# Patient Record
Sex: MALE | Race: WHITE | NOT HISPANIC OR LATINO | URBAN - METROPOLITAN AREA
[De-identification: names, ages, dates, MRNs, and addresses within clinical notes are randomized per-mention and may not be internally consistent; named-entity substitution may affect disease eponyms.]

---

## 2017-02-14 NOTE — PATIENT DISCUSSION
Each treatment protocol needs to be individualized to include treatment of aqueous deficiency, evaporative deficiency, blepharitis, lid malformations, lid hygiene, life style changes. Explanation patient education and extensive treatment protocols must be established.

## 2017-02-14 NOTE — PATIENT DISCUSSION
Considerable counseling and coordination of care is needed with each patient in order to establish an accurate treatment plan.

## 2017-02-14 NOTE — PATIENT DISCUSSION
While this is an established [place] patient, he/she is a new patient to the dry eye service. Dry eye disease is a complex, multifactorial disease and often times the symptoms and findings can be confusing. In order to accurately diagnose and treat dry eye disease, testing and extensive one on one patient time is needed. A complete review of all prescribed and OTC medications need to be reviewed. Also lifestyle choices such as contact lens wear, use of ceiling fans, and allergies need to be reviewed. A complete review of systems to look for underlying autoimmune disease should be included.

## 2017-08-15 NOTE — PATIENT DISCUSSION
DIVYA OU - Continue artificial tears as needed for comfort, and increase use with contact lenses in.

## 2017-08-15 NOTE — PATIENT DISCUSSION
Jaylon&rsquo;s Nodular Degeneration Counseling: The nature of the corneal scar or callus has been explained to the patient. Treatment options include artificial tears, mild topical steroids, or scraping / lamellar keratectomy of the callus or nodule. Treatment options may need to be considered if the patient has foreign body sensation, decreased vision, or upcoming cataract surgery. Infection is a risk of corneal scraping so the patient will need to be on topical antibiotic eye drops for several days. If treatment is deferred, the astigmatism which is caused by the nodule(s) may need to be followed with corneal topography.

## 2018-02-05 NOTE — PATIENT DISCUSSION
SALZMANN'S NODULAR DEGENERATION, OD:  RECOMMEND REFRESH ARTIFICIAL TEARS. RETURN TO CORNEAL SPECIALIST AS SCHEDULED.

## 2018-02-05 NOTE — PATIENT DISCUSSION
Salzmanns Nodular Degeneration Counseling: The diagnosis and its pathophysiology has been explained to the patient. Treatment options include artificial tears, mild topical steroids, or corneal scraping. The evaluation by a corneal specialist was recommended to the patient.

## 2018-02-05 NOTE — PATIENT DISCUSSION
OHTN, OU: INTRAOCULAR PRESSURE IS WITHIN ACCEPTABLE LIMITS. PATIENT INSTRUCTED TO CONTINUE DROPS AS PRESCRIBED AND RETURN FOR FOLLOW-UP AS SCHEDULED.

## 2018-02-05 NOTE — PATIENT DISCUSSION
Pinguecula Counseling:  I have explained to the patient at length the diagnosis of pinguecula and its pathophysiology. I recommended the patient adequately protect their eyes from excessive UV light and dry, dane conditions. The use of artificial tears in dry conditions was encouraged. Return for follow-up as scheduled.

## 2018-02-05 NOTE — PATIENT DISCUSSION
SURESH OU:  PRESCRIBE ARTIFICIAL TEARS BID - QID. DECREASE OUTDOOR EXPOSURE AND USE UV PROTECTION/ SUNGLASSES WITH OUTDOOR ACTIVITIES. RETURN FOR FOLLOW UP AS SCHEDULED.

## 2020-02-17 NOTE — PATIENT DISCUSSION
OHTN, OU:  HISTORY OF HIGH IOP. STABLE INTRAOCULAR PRESSURE WITHOUT OPTIC NERVE CHANGES. SINCE WITHIN NORMAL LIMITS, OK TO NOT SEE DR. SABA UNLESS CHANGES IN OCT/VF. RETURN FOR FOLLOW-UP AS SCHEDULED.

## 2020-02-17 NOTE — PATIENT DISCUSSION
02/17/2020Acuvue OasysOS+2. [id number]&nbsp;[place] &nbsp; &nbsp[place] [facility] [place]

## 2020-02-17 NOTE — PATIENT DISCUSSION
CORNEAL DEGENERATION, PELLUCID MARGINAL , OD: RETURN TO DR. Timoteo Hunter JUST AS NEEDED. RECOMMEND CESSATION OF EYE RUBBING.

## 2020-02-17 NOTE — PATIENT DISCUSSION
GAVE PATIENT SAMPLE OF ACUVUE 1 DAY MOIST &amp; ACUVUE OASYS 1 DAY. OK TO ORDER EITHER DAILY OR ORDER ACUVUE OASYS 2 WEEK.

## 2021-02-02 NOTE — PATIENT DISCUSSION
OHTN, OU:  HISTORY OF HIGH IOP. STABLE INTRAOCULAR PRESSURE WITHOUT OPTIC NERVE CHANGES. RECOMMEND GONIOSCOPY AND VISUAL FIELD EVALUATION WITH CLOSE OBSERVATION. RETURN FOR FOLLOW-UP AS SCHEDULED.

## 2021-02-02 NOTE — PATIENT DISCUSSION
CONTACT LENSES, OU: PT GIVEN FIRST TRIAL OF MONOVISION CONTACT LENSES. OK TO DO DISTANCE OU. BOTH RX'S PRINTED, PT MAY ORDER EITHER.

## 2022-07-28 ENCOUNTER — CONSULTATION/EVALUATION (OUTPATIENT)
Facility: LOCATION | Age: 67
End: 2022-07-28

## 2022-07-28 DIAGNOSIS — H25.813: ICD-10-CM

## 2022-07-28 PROCEDURE — 99204 OFFICE O/P NEW MOD 45 MIN: CPT

## 2022-07-28 PROCEDURE — 92136 OPHTHALMIC BIOMETRY: CPT

## 2022-07-28 PROCEDURE — 92134 CPTRZ OPH DX IMG PST SGM RTA: CPT

## 2022-07-28 PROCEDURE — 76519 ECHO EXAM OF EYE: CPT

## 2022-07-28 ASSESSMENT — TONOMETRY
OS_IOP_MMHG: 21
OD_IOP_MMHG: 23

## 2022-07-28 ASSESSMENT — VISUAL ACUITY
OD_CC: 20/200
OS_SC: CF 4FT
OS_SC: 20/800
OD_PH: 20/80
OD_CC: J1
OD_SC: 20/800
OU_SC: 20/800
OS_CC: 20/400
OU_CC: 20/200
OS_PH: 20/60
OU_CC: J1
OD_SC: CF 4FT
OU_SC: CF 4FT
OS_CC: J1+2

## 2022-09-14 ENCOUNTER — TECH ONLY (OUTPATIENT)
Facility: LOCATION | Age: 67
End: 2022-09-14

## 2022-09-14 DIAGNOSIS — H25.813: ICD-10-CM

## 2022-09-14 PROCEDURE — 92136TCNC INTERFEROMETRY -TECHNICAL COMPONENT NO CHARGE

## 2022-09-14 PROCEDURE — 92025NC CORNEAL TOPOGRAPHY NO CHARGE

## 2022-10-05 ENCOUNTER — SURGERY/PROCEDURE (OUTPATIENT)
Facility: LOCATION | Age: 67
End: 2022-10-05

## 2022-10-05 DIAGNOSIS — H25.812: ICD-10-CM

## 2022-10-05 PROCEDURE — 6698454AV REMOVE CATARACT, INSERT ADVANCED LENS (SX ONLY)

## 2022-10-06 ENCOUNTER — POST-OP (OUTPATIENT)
Facility: LOCATION | Age: 67
End: 2022-10-06

## 2022-10-06 DIAGNOSIS — Z96.1: ICD-10-CM

## 2022-10-06 PROCEDURE — 99024 POSTOP FOLLOW-UP VISIT: CPT

## 2022-10-06 ASSESSMENT — TONOMETRY
OS_IOP_MMHG: 42
OS_IOP_MMHG: 38
OS_IOP_MMHG: 36
OD_IOP_MMHG: 19
OS_IOP_MMHG: 46

## 2022-10-06 ASSESSMENT — VISUAL ACUITY
OD_SC: J1-2
OS_SC: 20/40
OS_SC: J1-2
OD_PH: 20/30-2
OD_SC: 20/60-2

## 2022-10-12 ENCOUNTER — SURGERY/PROCEDURE (OUTPATIENT)
Facility: LOCATION | Age: 67
End: 2022-10-12

## 2022-10-12 DIAGNOSIS — H25.811: ICD-10-CM

## 2022-10-12 PROCEDURE — 6698454AV REMOVE CATARACT, INSERT ADVANCED LENS (SX ONLY)

## 2022-10-13 ENCOUNTER — POST-OP (OUTPATIENT)
Facility: LOCATION | Age: 67
End: 2022-10-13

## 2022-10-13 DIAGNOSIS — Z96.1: ICD-10-CM

## 2022-10-13 PROCEDURE — 99024 POSTOP FOLLOW-UP VISIT: CPT

## 2022-10-13 ASSESSMENT — VISUAL ACUITY
OD_SC: 20/40
OS_PH: 20/30+2
OD_SC: J2
OU_SC: 20/25
OS_SC: 20/40+1
OS_SC: J1+
OD_PH: 20/25
OU_SC: J1+

## 2022-10-13 ASSESSMENT — TONOMETRY
OD_IOP_MMHG: 34
OS_IOP_MMHG: 21
OD_IOP_MMHG: 32
OS_IOP_MMHG: 23